# Patient Record
Sex: MALE | Race: WHITE | NOT HISPANIC OR LATINO | Employment: FULL TIME | URBAN - METROPOLITAN AREA
[De-identification: names, ages, dates, MRNs, and addresses within clinical notes are randomized per-mention and may not be internally consistent; named-entity substitution may affect disease eponyms.]

---

## 2022-03-08 ENCOUNTER — OFFICE VISIT (OUTPATIENT)
Dept: FAMILY MEDICINE | Facility: CLINIC | Age: 42
End: 2022-03-08
Payer: COMMERCIAL

## 2022-03-08 VITALS
DIASTOLIC BLOOD PRESSURE: 82 MMHG | OXYGEN SATURATION: 98 % | SYSTOLIC BLOOD PRESSURE: 121 MMHG | HEART RATE: 90 BPM | TEMPERATURE: 97.8 F | WEIGHT: 187 LBS | HEIGHT: 73 IN | BODY MASS INDEX: 24.78 KG/M2 | RESPIRATION RATE: 16 BRPM

## 2022-03-08 DIAGNOSIS — Z11.59 NEED FOR HEPATITIS C SCREENING TEST: ICD-10-CM

## 2022-03-08 DIAGNOSIS — K63.5 HYPERPLASTIC COLONIC POLYP, UNSPECIFIED PART OF COLON: ICD-10-CM

## 2022-03-08 DIAGNOSIS — Z12.11 SCREENING FOR COLON CANCER: ICD-10-CM

## 2022-03-08 DIAGNOSIS — Z80.0 FAMILY HISTORY OF COLON CANCER: ICD-10-CM

## 2022-03-08 DIAGNOSIS — Z00.00 ROUTINE GENERAL MEDICAL EXAMINATION AT A HEALTH CARE FACILITY: Primary | ICD-10-CM

## 2022-03-08 DIAGNOSIS — N52.9 ERECTILE DYSFUNCTION, UNSPECIFIED ERECTILE DYSFUNCTION TYPE: ICD-10-CM

## 2022-03-08 DIAGNOSIS — Z11.4 SCREENING FOR HIV (HUMAN IMMUNODEFICIENCY VIRUS): ICD-10-CM

## 2022-03-08 LAB
CHOLEST SERPL-MCNC: 219 MG/DL
FASTING STATUS PATIENT QL REPORTED: YES
FASTING STATUS PATIENT QL REPORTED: YES
GLUCOSE BLD-MCNC: 98 MG/DL (ref 70–99)
HCV AB SERPL QL IA: NONREACTIVE
HDLC SERPL-MCNC: 71 MG/DL
HIV 1+2 AB+HIV1 P24 AG SERPL QL IA: NONREACTIVE
LDLC SERPL CALC-MCNC: 118 MG/DL
NONHDLC SERPL-MCNC: 148 MG/DL
TRIGL SERPL-MCNC: 150 MG/DL

## 2022-03-08 PROCEDURE — 99386 PREV VISIT NEW AGE 40-64: CPT | Performed by: PHYSICIAN ASSISTANT

## 2022-03-08 PROCEDURE — 87389 HIV-1 AG W/HIV-1&-2 AB AG IA: CPT | Performed by: PHYSICIAN ASSISTANT

## 2022-03-08 PROCEDURE — 82947 ASSAY GLUCOSE BLOOD QUANT: CPT | Performed by: PHYSICIAN ASSISTANT

## 2022-03-08 PROCEDURE — 86803 HEPATITIS C AB TEST: CPT | Performed by: PHYSICIAN ASSISTANT

## 2022-03-08 PROCEDURE — 36415 COLL VENOUS BLD VENIPUNCTURE: CPT | Performed by: PHYSICIAN ASSISTANT

## 2022-03-08 PROCEDURE — 80061 LIPID PANEL: CPT | Performed by: PHYSICIAN ASSISTANT

## 2022-03-08 RX ORDER — SILDENAFIL CITRATE 20 MG/1
TABLET ORAL
Qty: 30 TABLET | Refills: 11 | Status: SHIPPED | OUTPATIENT
Start: 2022-03-08 | End: 2023-10-10

## 2022-03-08 SDOH — HEALTH STABILITY: PHYSICAL HEALTH: ON AVERAGE, HOW MANY MINUTES DO YOU ENGAGE IN EXERCISE AT THIS LEVEL?: 0 MIN

## 2022-03-08 SDOH — ECONOMIC STABILITY: INCOME INSECURITY: HOW HARD IS IT FOR YOU TO PAY FOR THE VERY BASICS LIKE FOOD, HOUSING, MEDICAL CARE, AND HEATING?: NOT HARD AT ALL

## 2022-03-08 SDOH — ECONOMIC STABILITY: TRANSPORTATION INSECURITY
IN THE PAST 12 MONTHS, HAS LACK OF TRANSPORTATION KEPT YOU FROM MEETINGS, WORK, OR FROM GETTING THINGS NEEDED FOR DAILY LIVING?: YES

## 2022-03-08 SDOH — ECONOMIC STABILITY: FOOD INSECURITY: WITHIN THE PAST 12 MONTHS, THE FOOD YOU BOUGHT JUST DIDN'T LAST AND YOU DIDN'T HAVE MONEY TO GET MORE.: NEVER TRUE

## 2022-03-08 SDOH — ECONOMIC STABILITY: INCOME INSECURITY: IN THE LAST 12 MONTHS, WAS THERE A TIME WHEN YOU WERE NOT ABLE TO PAY THE MORTGAGE OR RENT ON TIME?: NO

## 2022-03-08 SDOH — ECONOMIC STABILITY: TRANSPORTATION INSECURITY
IN THE PAST 12 MONTHS, HAS THE LACK OF TRANSPORTATION KEPT YOU FROM MEDICAL APPOINTMENTS OR FROM GETTING MEDICATIONS?: YES

## 2022-03-08 SDOH — ECONOMIC STABILITY: FOOD INSECURITY: WITHIN THE PAST 12 MONTHS, YOU WORRIED THAT YOUR FOOD WOULD RUN OUT BEFORE YOU GOT MONEY TO BUY MORE.: NEVER TRUE

## 2022-03-08 SDOH — HEALTH STABILITY: PHYSICAL HEALTH: ON AVERAGE, HOW MANY DAYS PER WEEK DO YOU ENGAGE IN MODERATE TO STRENUOUS EXERCISE (LIKE A BRISK WALK)?: 0 DAYS

## 2022-03-08 ASSESSMENT — LIFESTYLE VARIABLES
HOW MANY STANDARD DRINKS CONTAINING ALCOHOL DO YOU HAVE ON A TYPICAL DAY: 3 OR 4
HOW OFTEN DO YOU HAVE SIX OR MORE DRINKS ON ONE OCCASION: MONTHLY
HOW OFTEN DO YOU HAVE A DRINK CONTAINING ALCOHOL: 4 OR MORE TIMES A WEEK

## 2022-03-08 ASSESSMENT — ENCOUNTER SYMPTOMS
EYE PAIN: 0
CHILLS: 0
WEAKNESS: 0
ABDOMINAL PAIN: 0
SORE THROAT: 0
PALPITATIONS: 0
MYALGIAS: 0
FREQUENCY: 0
COUGH: 0
FEVER: 0
HEARTBURN: 0
JOINT SWELLING: 0
HEADACHES: 0
NERVOUS/ANXIOUS: 0
DYSURIA: 0
SHORTNESS OF BREATH: 0
HEMATURIA: 0
CONSTIPATION: 0
ARTHRALGIAS: 0
HEMATOCHEZIA: 0
PARESTHESIAS: 0
DIARRHEA: 0
NAUSEA: 0
DIZZINESS: 0

## 2022-03-08 ASSESSMENT — SOCIAL DETERMINANTS OF HEALTH (SDOH)
HOW OFTEN DO YOU GET TOGETHER WITH FRIENDS OR RELATIVES?: ONCE A WEEK
IN A TYPICAL WEEK, HOW MANY TIMES DO YOU TALK ON THE PHONE WITH FAMILY, FRIENDS, OR NEIGHBORS?: MORE THAN THREE TIMES A WEEK
DO YOU BELONG TO ANY CLUBS OR ORGANIZATIONS SUCH AS CHURCH GROUPS UNIONS, FRATERNAL OR ATHLETIC GROUPS, OR SCHOOL GROUPS?: YES
HOW OFTEN DO YOU ATTEND CHURCH OR RELIGIOUS SERVICES?: MORE THAN 4 TIMES PER YEAR

## 2022-03-08 NOTE — LETTER
March 8, 2022      Emil Aparicio  [address] [place] [id number]        Dear ,    We are writing to inform you of your test results.    Cholesterol is a bit higher than ideal but all other labs were normal. Improved diet and exercise changes where possible is all that is recommended and we can recheck this in 1 year. A low fat, carbohydrate-controlled diet should be adopted. Saturated fat should not make up more than 7% of your total daily calories, carbohydrates should be restricted to 50% to 60% of daily calories, and simple sugars like sucrose should be avoided. You may also consider increasing your intake of oily fish by either an increase in your fish intake or a fish oil supplement to at least 2 servings per week. Alcohol should also be limited and at least 30 minutes of aerobic exercise 5 days a week would be beneficial to implement.        Resulted Orders   HIV Antigen Antibody Combo   Result Value Ref Range    HIV Antigen Antibody Combo Nonreactive Nonreactive      Comment:      HIV-1 p24 Ag & HIV-1/HIV-2 Ab Not Detected   Hepatitis C Screen Reflex to HCV RNA Quant and Genotype   Result Value Ref Range    Hepatitis C Antibody Nonreactive Nonreactive    Narrative    Assay performance characteristics have not been established for newborns, infants, and children.   Lipid panel reflex to direct LDL Fasting   Result Value Ref Range    Cholesterol 219 (H) <200 mg/dL    Triglycerides 150 (H) <150 mg/dL    Direct Measure HDL 71 >=40 mg/dL    LDL Cholesterol Calculated 118 (H) <=100 mg/dL    Non HDL Cholesterol 148 (H) <130 mg/dL    Patient Fasting > 8hrs? Yes     Narrative    Cholesterol  Desirable:  <200 mg/dL    Triglycerides  Normal:  Less than 150 mg/dL  Borderline High:  150-199 mg/dL  High:  200-499 mg/dL  Very High:  Greater than or equal to 500 mg/dL    Direct Measure HDL  Female:  Greater than or equal to 50 mg/dL   Male:  Greater than or equal to 40 mg/dL    LDL  Cholesterol  Desirable:  <100mg/dL  Above Desirable:  100-129 mg/dL   Borderline High:  130-159 mg/dL   High:  160-189 mg/dL   Very High:  >= 190 mg/dL    Non HDL Cholesterol  Desirable:  130 mg/dL  Above Desirable:  130-159 mg/dL  Borderline High:  160-189 mg/dL  High:  190-219 mg/dL  Very High:  Greater than or equal to 220 mg/dL   Glucose   Result Value Ref Range    Glucose 98 70 - 99 mg/dL    Patient Fasting > 8hrs? Yes        If you have any questions or concerns, please call the clinic at the number listed above.       Sincerely,      Konrad Correa PA-C

## 2022-03-08 NOTE — PROGRESS NOTES
SUBJECTIVE:   CC: Emil Viera CARMENCITA Aparicio is an 41 year old male who presents for preventative health visit.         Healthy Habits:     Getting at least 3 servings of Calcium per day:  NO    Bi-annual eye exam:  Yes    Dental care twice a year:  Yes    Sleep apnea or symptoms of sleep apnea:  Sleep apnea    Diet:  Breakfast skipped    Frequency of exercise:  None    Taking medications regularly:  Yes    Medication side effects:  None    PHQ-2 Total Score: 0    Additional concerns today:  Yes        Needing referral for colonoscopy. Family history of colon cancer but not in first degree relatives. [facility] obtains this every 5 years since age 28 and is overdue. Per [facility] review, history of hyperplastic polyps.     Also [facility] for ~1 year notes troubles obtaining and maintaining erection at times. Requesting management for this. [facility] went through divorce years ago and this is now a new problem. No urination changes.     Today's PHQ-2 Score:   PHQ-2 ( 1999 Pf[employer]) 3/8/2022   Q1: Little interest or pleasure in doing things 0   Q2: Feeling down, depressed or hopeless 0   PHQ-2 Score 0   Q1: Little interest or pleasure in doing things Not at all   Q2: Feeling down, depressed or hopeless Not at all   PHQ-2 Score 0       Abuse: Current or Past(Physical, Sexual or Emotional)- No  Do you feel safe in your environment? Yes    Have you ever done Advance Care Planning? (For example, a Health Directive, POLST, or a discussion with a medical provider or your loved ones about your wishes): No, advance care planning information given to patient to review.  Advanced care planning was discussed at today's visit.    Social History     Tobacco Use     Smoking status: Never Smoker     Smokeless tobacco: Never Used   Substance Use Topics     Alcohol use: Yes     If you drink alcohol do you typically have >3 drinks per day or >7 drinks per week? Yes      Alcohol Use 3/8/2022   Prescreen: >3 drinks/day or >7 drinks/week? Yes   Prescreen: >3  drinks/day or >7 drinks/week? -   AUDIT SCORE  7     AUDIT - Alcohol Use Disorders Identification Test - Reproduced from the World Health Organization Audit 2001 (Second Edition) 3/8/2022   1.  How often do you have a drink containing alcohol? 4 or more times a week   2.  How many drinks containing alcohol do you have on a typical day when you are drinking? 3 or 4   3.  How often do you have five or more drinks on one occasion? Monthly   4.  How often during the last year have you found that you were not able to stop drinking once you had started? Never   5.  How often during the last year have you failed to do what was normally expected of you because of drinking? Never   6.  How often during the last year have you needed a first drink in the morning to get yourself going after a heavy drinking session? Never   7.  How often during the last year have you had a feeling of guilt or remorse after drinking? Never   8.  How often during the last year have you been unable to remember what happened the night before because of your drinking? Never   9.  Have you or someone else been injured because of your drinking? No   10. Has a relative, friend, doctor or other health care worker been concerned about your drinking or suggested you cut down? No   TOTAL SCORE 7       Last PSA: No results found for: PSA    Reviewed orders with patient. Reviewed health maintenance and updated orders accordingly - Yes  BP Readings from Last 3 Encounters:   03/08/22 121/82    Wt Readings from Last 3 Encounters:   03/08/22 84.8 kg (187 lb)                  Patient Active Problem List   Diagnosis     Hyperplastic colonic polyp, unspecified part of colon     History reviewed. No pertinent surgical history.    Social History     Tobacco Use     Smoking status: Never Smoker     Smokeless tobacco: Never Used   Substance Use Topics     Alcohol use: Yes     Family History   Problem Relation Age of Onset     Kidney Cancer Father      Colon Cancer  "Paternal Grandfather      Colon Cancer Paternal Uncle      Colon Cancer Paternal Uncle          Current Outpatient Medications   Medication Sig Dispense Refill     sildenafil (REVATIO) 20 MG tablet Take 1-5 tabs ( mg) 30 minutes to 4 hours prior to intercourse. 30 tablet 11     No Known Allergies  Recent Labs   Lab Test 03/08/22  0859   *   HDL 71   TRIG 150*        Reviewed and updated as needed this visit by clinical staff   Tobacco  Allergies  Meds  Problems  Med Hx  Surg Hx  Fam Hx  Soc   Hx        Reviewed and updated as needed this visit by Provider   Tobacco  Allergies  Meds  Problems  Med Hx  Surg Hx  Fam Hx         History reviewed. No pertinent past medical history.   History reviewed. No pertinent surgical history.    Review of Systems   Constitutional: Negative for chills and fever.   HENT: Negative for congestion, ear pain, hearing loss and sore throat.    Eyes: Negative for pain and visual disturbance.   Respiratory: Negative for cough and shortness of breath.    Cardiovascular: Negative for chest pain, palpitations and peripheral edema.   Gastrointestinal: Negative for abdominal pain, constipation, diarrhea, heartburn, hematochezia and nausea.   Genitourinary: Positive for impotence. Negative for dysuria, frequency, genital sores, hematuria, penile discharge and urgency.   Musculoskeletal: Negative for arthralgias, joint swelling and myalgias.   Skin: Negative for rash.   Neurological: Negative for dizziness, weakness, headaches and paresthesias.   Psychiatric/Behavioral: Negative for mood changes. The patient is not nervous/anxious.          OBJECTIVE:   /82 (BP Location: Right arm, Patient Position: Chair, Cuff Size: Adult Large)   Pulse 90   Temp 97.8  F (36.6  C) (Oral)   Resp 16   Ht 1.861 m (6' 1.25\")   Wt 84.8 kg (187 lb)   SpO2 98%   BMI 24.50 kg/m      Physical Exam  GENERAL: healthy, alert and no distress  EYES: Eyes grossly normal to inspection, PERRL " and conjunctivae and sclerae normal  HENT: ear canals and TM's normal, nose and mouth without ulcers or lesions  NECK: no adenopathy, no asymmetry, masses, or scars and thyroid normal to palpation  RESP: lungs clear to auscultation - no rales, rhonchi or wheezes  CV: regular rate and rhythm, normal S1 S2, no S3 or S4, no murmur, click or rub, no peripheral edema and peripheral pulses strong  ABDOMEN: soft, nontender, no hepatosplenomegaly, no masses and bowel sounds normal   (male): normal male genitalia without lesions or urethral discharge, no hernia  MS: no gross musculoskeletal defects noted, no edema  SKIN: no suspicious lesions or rashes  NEURO: Normal strength and tone, mentation intact and speech normal  PSYCH: mentation appears normal, affect normal/bright  LYMPH: no cervical adenopathy    Diagnostic Test Results:  none     ASSESSMENT/PLAN:   (Z00.00) Routine general medical examination at a health care facility  (primary encounter diagnosis)  Comment: stable exam. Labs pending. Discussed diet and exercise as well as alcohol use. All can be affecting ED. Will manage as below as well.   Plan:     (Z11.59) Need for hepatitis C screening test  Comment:   Plan: Hepatitis C Screen Reflex to HCV RNA Quant and         Genotype            (Z11.4) Screening for HIV (human immunodeficiency virus)  Comment:   Plan: HIV Antigen Antibody Combo            (Z80.0) Family history of colon cancer  Comment:   Plan: Adult Gastro Ref - Procedure Only            (Z12.11) Screening for colon cancer  Comment:   Plan: Lipid panel reflex to direct LDL Fasting,         Glucose, Adult Gastro Ref - Procedure Only            (N52.9) Erectile dysfunction, unspecified erectile dysfunction type  Comment: prn management recommended but will assess for underling cause in lipids and glucose. bp controlled. Recommending diet and alcohol avoidance management as well.   Plan: sildenafil (REVATIO) 20 MG tablet        -Medication use and side  "effects discussed with the patient. Patient is in complete understanding and agreement with plan.     (K63.5) Hyperplastic colonic polyp, unspecified part of colon  Comment:   Plan: Adult Gastro Ref - Procedure Only            COUNSELING:   Reviewed preventive health counseling, as reflected in patient instructions       Regular exercise       Healthy diet/nutrition       Immunizations    Declined: Influenza due to Conscientious objector               Consider Hep C screening for all patients one time for ages 18-79 years       HIV screeninx in teen years, 1x in adult years, and at intervals if high risk    Estimated body mass index is 24.5 kg/m  as calculated from the following:    Height as of this encounter: 1.861 m (6' 1.25\").    Weight as of this encounter: 84.8 kg (187 lb).         He reports that he has never smoked. He has never used smokeless tobacco.      Counseling Resources:  ATP IV Guidelines  Pooled Cohorts Equation Calculator  FRAX Risk Assessment  ICSI Preventive Guidelines  Dietary Guidelines for Americans,   USDA's MyPlate  ASA Prophylaxis  Lung CA Screening    Konrad Correa PA-C  [facility]"

## 2022-03-24 ENCOUNTER — TRANSFERRED RECORDS (OUTPATIENT)
Dept: HEALTH INFORMATION MANAGEMENT | Facility: CLINIC | Age: 42
End: 2022-03-24
Payer: COMMERCIAL

## 2022-10-07 ENCOUNTER — APPOINTMENT (OUTPATIENT)
Dept: GENERAL RADIOLOGY | Facility: CLINIC | Age: 42
End: 2022-10-07
Attending: EMERGENCY MEDICINE
Payer: COMMERCIAL

## 2022-10-07 ENCOUNTER — HOSPITAL ENCOUNTER (EMERGENCY)
Facility: CLINIC | Age: 42
Discharge: HOME | End: 2022-10-07
Attending: EMERGENCY MEDICINE | Admitting: EMERGENCY MEDICINE
Payer: COMMERCIAL

## 2022-10-07 VITALS
DIASTOLIC BLOOD PRESSURE: 83 MMHG | HEART RATE: 90 BPM | BODY MASS INDEX: 24.24 KG/M2 | OXYGEN SATURATION: 99 % | WEIGHT: 185 LBS | SYSTOLIC BLOOD PRESSURE: 123 MMHG | RESPIRATION RATE: 17 BRPM | TEMPERATURE: 98.8 F

## 2022-10-07 DIAGNOSIS — R11.0 NAUSEA: ICD-10-CM

## 2022-10-07 DIAGNOSIS — J40 BRONCHITIS: ICD-10-CM

## 2022-10-07 DIAGNOSIS — R30.0 DYSURIA: ICD-10-CM

## 2022-10-07 LAB
ALBUMIN SERPL BCG-MCNC: 4 G/DL (ref 3.5–5.2)
ALBUMIN UR-MCNC: 10 MG/DL
ALP SERPL-CCNC: 59 U/L (ref 40–129)
ALT SERPL W P-5'-P-CCNC: 32 U/L (ref 10–50)
ANION GAP SERPL CALCULATED.3IONS-SCNC: 11 MMOL/L (ref 7–15)
APPEARANCE UR: CLEAR
AST SERPL W P-5'-P-CCNC: 24 U/L (ref 10–50)
ATRIAL RATE - MUSE: 96 BPM
BASOPHILS # BLD AUTO: 0 10E3/UL (ref 0–0.2)
BASOPHILS NFR BLD AUTO: 0 %
BILIRUB SERPL-MCNC: 0.5 MG/DL
BILIRUB UR QL STRIP: NEGATIVE
BUN SERPL-MCNC: 13.7 MG/DL (ref 6–20)
CALCIUM SERPL-MCNC: 8.9 MG/DL (ref 8.6–10)
CHLORIDE SERPL-SCNC: 98 MMOL/L (ref 98–107)
COLOR UR AUTO: YELLOW
CREAT SERPL-MCNC: 0.57 MG/DL (ref 0.67–1.17)
DEPRECATED HCO3 PLAS-SCNC: 25 MMOL/L (ref 22–29)
DEPRECATED S PYO AG THROAT QL EIA: NEGATIVE
DIASTOLIC BLOOD PRESSURE - MUSE: NORMAL MMHG
EOSINOPHIL # BLD AUTO: 0 10E3/UL (ref 0–0.7)
EOSINOPHIL NFR BLD AUTO: 0 %
ERYTHROCYTE [DISTWIDTH] IN BLOOD BY AUTOMATED COUNT: 11.9 % (ref 10–15)
FLUAV RNA SPEC QL NAA+PROBE: NEGATIVE
FLUBV RNA RESP QL NAA+PROBE: NEGATIVE
GFR SERPL CREATININE-BSD FRML MDRD: >90 ML/MIN/1.73M2
GLUCOSE SERPL-MCNC: 110 MG/DL (ref 70–99)
GLUCOSE UR STRIP-MCNC: NEGATIVE MG/DL
GROUP A STREP BY PCR: NOT DETECTED
HCT VFR BLD AUTO: 45.7 % (ref 40–53)
HGB BLD-MCNC: 15.4 G/DL (ref 13.3–17.7)
HGB UR QL STRIP: NEGATIVE
HIV 1+2 AB+HIV1 P24 AG SERPL QL IA: NONREACTIVE
HOLD SPECIMEN: NORMAL
HOLD SPECIMEN: NORMAL
IMM GRANULOCYTES # BLD: 0 10E3/UL
IMM GRANULOCYTES NFR BLD: 0 %
INTERPRETATION ECG - MUSE: NORMAL
KETONES UR STRIP-MCNC: 100 MG/DL
LEUKOCYTE ESTERASE UR QL STRIP: NEGATIVE
LIPASE SERPL-CCNC: 27 U/L (ref 13–60)
LYMPHOCYTES # BLD AUTO: 0.5 10E3/UL (ref 0.8–5.3)
LYMPHOCYTES NFR BLD AUTO: 8 %
MAGNESIUM SERPL-MCNC: 1.9 MG/DL (ref 1.7–2.3)
MCH RBC QN AUTO: 31 PG (ref 26.5–33)
MCHC RBC AUTO-ENTMCNC: 33.7 G/DL (ref 31.5–36.5)
MCV RBC AUTO: 92 FL (ref 78–100)
MONOCYTES # BLD AUTO: 0.8 10E3/UL (ref 0–1.3)
MONOCYTES NFR BLD AUTO: 11 %
MONOCYTES NFR BLD AUTO: NEGATIVE %
MUCOUS THREADS #/AREA URNS LPF: PRESENT /LPF
NEUTROPHILS # BLD AUTO: 5.5 10E3/UL (ref 1.6–8.3)
NEUTROPHILS NFR BLD AUTO: 81 %
NITRATE UR QL: NEGATIVE
NRBC # BLD AUTO: 0 10E3/UL
NRBC BLD AUTO-RTO: 0 /100
P AXIS - MUSE: 46 DEGREES
PH UR STRIP: 5.5 [PH] (ref 5–7)
PLATELET # BLD AUTO: 138 10E3/UL (ref 150–450)
POTASSIUM SERPL-SCNC: 4 MMOL/L (ref 3.4–5.3)
PR INTERVAL - MUSE: 148 MS
PROT SERPL-MCNC: 6.9 G/DL (ref 6.4–8.3)
QRS DURATION - MUSE: 94 MS
QT - MUSE: 344 MS
QTC - MUSE: 434 MS
R AXIS - MUSE: -34 DEGREES
RBC # BLD AUTO: 4.97 10E6/UL (ref 4.4–5.9)
RBC URINE: 3 /HPF
RSV RNA SPEC NAA+PROBE: NEGATIVE
SARS-COV-2 RNA RESP QL NAA+PROBE: NEGATIVE
SODIUM SERPL-SCNC: 134 MMOL/L (ref 136–145)
SP GR UR STRIP: 1.02 (ref 1–1.03)
SYSTOLIC BLOOD PRESSURE - MUSE: NORMAL MMHG
T AXIS - MUSE: 24 DEGREES
T PALLIDUM AB SER QL: NONREACTIVE
TSH SERPL DL<=0.005 MIU/L-ACNC: 0.77 UIU/ML (ref 0.3–4.2)
UROBILINOGEN UR STRIP-MCNC: NORMAL MG/DL
VENTRICULAR RATE- MUSE: 96 BPM
WBC # BLD AUTO: 6.8 10E3/UL (ref 4–11)
WBC URINE: 2 /HPF

## 2022-10-07 PROCEDURE — 83690 ASSAY OF LIPASE: CPT | Performed by: EMERGENCY MEDICINE

## 2022-10-07 PROCEDURE — 84443 ASSAY THYROID STIM HORMONE: CPT | Performed by: EMERGENCY MEDICINE

## 2022-10-07 PROCEDURE — 96360 HYDRATION IV INFUSION INIT: CPT

## 2022-10-07 PROCEDURE — 99285 EMERGENCY DEPT VISIT HI MDM: CPT | Mod: CS,25

## 2022-10-07 PROCEDURE — 87591 N.GONORRHOEAE DNA AMP PROB: CPT | Performed by: EMERGENCY MEDICINE

## 2022-10-07 PROCEDURE — 81001 URINALYSIS AUTO W/SCOPE: CPT | Performed by: EMERGENCY MEDICINE

## 2022-10-07 PROCEDURE — 87651 STREP A DNA AMP PROBE: CPT | Performed by: EMERGENCY MEDICINE

## 2022-10-07 PROCEDURE — 80053 COMPREHEN METABOLIC PANEL: CPT | Performed by: EMERGENCY MEDICINE

## 2022-10-07 PROCEDURE — 36415 COLL VENOUS BLD VENIPUNCTURE: CPT | Performed by: EMERGENCY MEDICINE

## 2022-10-07 PROCEDURE — 93005 ELECTROCARDIOGRAM TRACING: CPT

## 2022-10-07 PROCEDURE — 87389 HIV-1 AG W/HIV-1&-2 AB AG IA: CPT | Performed by: EMERGENCY MEDICINE

## 2022-10-07 PROCEDURE — 83735 ASSAY OF MAGNESIUM: CPT | Performed by: EMERGENCY MEDICINE

## 2022-10-07 PROCEDURE — C9803 HOPD COVID-19 SPEC COLLECT: HCPCS

## 2022-10-07 PROCEDURE — 82040 ASSAY OF SERUM ALBUMIN: CPT | Performed by: EMERGENCY MEDICINE

## 2022-10-07 PROCEDURE — 71046 X-RAY EXAM CHEST 2 VIEWS: CPT

## 2022-10-07 PROCEDURE — 87637 SARSCOV2&INF A&B&RSV AMP PRB: CPT | Performed by: EMERGENCY MEDICINE

## 2022-10-07 PROCEDURE — 86308 HETEROPHILE ANTIBODY SCREEN: CPT | Performed by: EMERGENCY MEDICINE

## 2022-10-07 PROCEDURE — 87491 CHLMYD TRACH DNA AMP PROBE: CPT | Performed by: EMERGENCY MEDICINE

## 2022-10-07 PROCEDURE — 86780 TREPONEMA PALLIDUM: CPT | Performed by: EMERGENCY MEDICINE

## 2022-10-07 PROCEDURE — 258N000003 HC RX IP 258 OP 636: Performed by: EMERGENCY MEDICINE

## 2022-10-07 PROCEDURE — 85025 COMPLETE CBC W/AUTO DIFF WBC: CPT | Performed by: EMERGENCY MEDICINE

## 2022-10-07 RX ORDER — ONDANSETRON 4 MG/1
4 TABLET, ORALLY DISINTEGRATING ORAL EVERY 8 HOURS PRN
Qty: 10 TABLET | Refills: 0 | Status: SHIPPED | OUTPATIENT
Start: 2022-10-07 | End: 2022-10-10

## 2022-10-07 RX ORDER — AZITHROMYCIN 250 MG/1
TABLET, FILM COATED ORAL
Qty: 6 TABLET | Refills: 0 | Status: SHIPPED | OUTPATIENT
Start: 2022-10-07 | End: 2022-10-12

## 2022-10-07 RX ORDER — SODIUM CHLORIDE 9 MG/ML
INJECTION, SOLUTION INTRAVENOUS CONTINUOUS
Status: DISCONTINUED | OUTPATIENT
Start: 2022-10-07 | End: 2022-10-07 | Stop reason: HOSPADM

## 2022-10-07 RX ADMIN — SODIUM CHLORIDE 1000 ML: 9 INJECTION, SOLUTION INTRAVENOUS at 09:40

## 2022-10-07 ASSESSMENT — ENCOUNTER SYMPTOMS
DYSURIA: 1
NAUSEA: 1
FATIGUE: 1
COUGH: 1
SORE THROAT: 1
FREQUENCY: 1
CHILLS: 1
APPETITE CHANGE: 1

## 2022-10-07 ASSESSMENT — ACTIVITIES OF DAILY LIVING (ADL): ADLS_ACUITY_SCORE: 35

## 2022-10-07 NOTE — DISCHARGE INSTRUCTIONS
Return to the ER for any worsening symptoms.    Please follow-up with your primary care clinic in 1 week.

## 2022-10-07 NOTE — ED PROVIDER NOTES
History   Chief Complaint:  Cough       The history is provided by the patient.      Emil Aparicio is an otherwise healthy 41 year old male who presents with a cough. The patient reports having four weeks of a productive cough, which he thinks might be related to trying to clear his throat. He states that then 3 days ago he developed significant fatigue, but was unable to sleep at night due to chills. He states that for the past 2 days he has laid in bed all day, and has been unable to eat due to being nauseous and having two painful canker sores. He reports dry heaving due to the waves of nausea. He also reports experiencing urinary frequency two nights ago, and then dysuria starting yesterday. He endorses a sore throat, but states that he has had 3 negative Covid tests at home. He denies any rash. He reports significant alcohol use, but denies ever having withdrawal symptoms or experiencing abdominal pain after drinking. He denies any sick exposures including at work, and states that he has been with the same sexual partner for the past 3.5 months. He states that he is generally healthy, and only takes daily medications for allergies. He notes a family history of colon cancer but no cardiac issues. No drug use or tobacco use.     Review of Systems   Constitutional: Positive for appetite change, chills and fatigue.   HENT: Positive for sore throat.    Respiratory: Positive for cough.    Gastrointestinal: Positive for nausea.   Genitourinary: Positive for dysuria and frequency.   Skin: Negative for rash.   All other systems reviewed and are negative.        Allergies:  The patient has no known allergies.     Medications:  The patient is currently on no regular medications.    Past Medical History:     Hyperplastic colonic polyp  Allergic rhinitis  Anal fissure  Hemorrhoids     Family History:    Father: kidney cancer    Social History:  The patient presents to the ED unaccompanied  Alcohol Use:  positive  Tobacco Use: negative  Drug Use: negative  Occupation:  for [employer]  PCP: Konrad Correa     Physical Exam     Patient Vitals for the past 24 hrs:   BP Temp Temp src Pulse Resp SpO2 Weight   10/07/22 1115 123/83 -- -- 90 -- -- --   10/07/22 1100 131/81 -- -- 88 17 99 % --   10/07/22 0945 130/82 -- -- 93 -- 95 % --   10/07/22 0903 (!) 136/101 98.8  F (37.1  C) Temporal 112 14 98 % 83.9 kg (185 lb)       Physical Exam  Constitutional:       General: He is not in acute distress.     Appearance: He is not diaphoretic.   HENT:      Head: Atraumatic.      Right Ear: Tympanic membrane, ear canal and external ear normal.      Left Ear: Tympanic membrane, ear canal and external ear normal.      Mouth/Throat:      Mouth: Mucous membranes are moist.      Pharynx: Oropharynx is clear. No oropharyngeal exudate or posterior oropharyngeal erythema.   Eyes:      General: No scleral icterus.     Pupils: Pupils are equal, round, and reactive to light.   Cardiovascular:      Rate and Rhythm: Normal rate and regular rhythm.      Heart sounds: Normal heart sounds.   Pulmonary:      Effort: No respiratory distress.      Breath sounds: Normal breath sounds.   Abdominal:      General: Bowel sounds are normal.      Palpations: Abdomen is soft.      Tenderness: There is no abdominal tenderness.   Musculoskeletal:         General: No tenderness.      Cervical back: Normal range of motion and neck supple. No rigidity.      Right lower leg: No edema.      Left lower leg: No edema.   Skin:     General: Skin is warm.      Capillary Refill: Capillary refill takes less than 2 seconds.      Findings: No rash.   Neurological:      General: No focal deficit present.      Mental Status: He is oriented to person, place, and time.   Psychiatric:         Mood and Affect: Mood normal.         Behavior: Behavior normal.           Emergency Department Course   ECG  ECG taken at 0935, ECG read at 0938  Normal sinus rhythm. Left  axis deviation. Abnormal ECG.   No prior ECG for comparison.   Rate 96 bpm. WV interval 148. QRS duration 94. QT/QTc 344/434. P-R-T axes 46 -34 24.    Imaging:  XR Chest 2 Views   Final Result   IMPRESSION:  There are no acute infiltrates. The cardiac silhouette is   not enlarged. Pulmonary vasculature is unremarkable.       INA MARTINEZ MD            SYSTEM ID:  [id number]        Report per radiology    Laboratory:  Labs Ordered and Resulted from Time of ED Arrival to Time of ED Departure   COMPREHENSIVE METABOLIC PANEL - Abnormal       Result Value    Sodium 134 (*)     Potassium 4.0      Chloride 98      Carbon Dioxide (CO2) 25      Anion Gap 11      Urea Nitrogen 13.7      Creatinine 0.57 (*)     Calcium 8.9      Glucose 110 (*)     Alkaline Phosphatase 59      AST 24      ALT 32      Protein Total 6.9      Albumin 4.0      Bilirubin Total 0.5      GFR Estimate >90     ROUTINE UA WITH MICROSCOPIC REFLEX TO CULTURE - Abnormal    Color Urine Yellow      Appearance Urine Clear      Glucose Urine Negative      Bilirubin Urine Negative      Ketones Urine 100  (*)     Specific Gravity Urine 1.023      Blood Urine Negative      pH Urine 5.5      Protein Albumin Urine 10  (*)     Urobilinogen Urine Normal      Nitrite Urine Negative      Leukocyte Esterase Urine Negative      Mucus Urine Present (*)     RBC Urine 3 (*)     WBC Urine 2     CBC WITH PLATELETS AND DIFFERENTIAL - Abnormal    WBC Count 6.8      RBC Count 4.97      Hemoglobin 15.4      Hematocrit 45.7      MCV 92      MCH 31.0      MCHC 33.7      RDW 11.9      Platelet Count 138 (*)     % Neutrophils 81      % Lymphocytes 8      % Monocytes 11      % Eosinophils 0      % Basophils 0      % Immature Granulocytes 0      NRBCs per 100 WBC 0      Absolute Neutrophils 5.5      Absolute Lymphocytes 0.5 (*)     Absolute Monocytes 0.8      Absolute Eosinophils 0.0      Absolute Basophils 0.0      Absolute Immature Granulocytes 0.0      Absolute NRBCs 0.0     LIPASE -  Normal    Lipase 27     MAGNESIUM - Normal    Magnesium 1.9     TSH WITH FREE T4 REFLEX - Normal    TSH 0.77     INFLUENZA A/B & SARS-COV2 PCR MULTIPLEX - Normal    Influenza A PCR Negative      Influenza B PCR Negative      RSV PCR Negative      SARS CoV2 PCR Negative     MONONUCLEOSIS SCREEN - Normal    Mononucleosis Screen Negative     STREPTOCOCCUS A RAPID SCREEN W REFELX TO PCR - Normal    Group A Strep antigen Negative     HIV ANTIGEN ANTIBODY COMBO   NEISSERIA GONORRHOEAE PCR   CHLAMYDIA TRACHOMATIS PCR        Emergency Department Course:           Reviewed:  I reviewed nursing notes, vitals and past medical history    Assessments:  0910 I obtained history and examined the patient as noted above.   1120 I rechecked the patient and explained findings.     Interventions:  0940 NS 1L IV    Disposition:  The patient was discharged to home.     Impression & Plan     Medical Decision Making:  This patient is a 41-year-old man who presents to the emergency department for evaluation of coughing over the last 4 weeks.  At times he feels nauseous which has been a recent development.  Has been having dysuria and increased urinary frequency.    X-ray does not show pneumonia.  Lab work-up overall looks good.  Urine does not appear to be infected.  Blood sugar looks good.  The patient has been very stable throughout almost 3 hours of observation here.  He did request to be checked for an STD although he says he has not had any recent new sexual partners.  GC and chlamydia as well as HIV and syphilis are pending.    The patient is overall stable.  Given his ongoing cough he will be treated with azithromycin for bronchitis.  He will follow-up with his primary care physician in the outpatient setting.    Diagnosis:    ICD-10-CM    1. Bronchitis  J40    2. Dysuria  R30.0    3. Nausea  R11.0        Discharge Medications:  Discharge Medication List as of 10/7/2022 11:30 AM      START taking these medications    Details    azithromycin (ZITHROMAX Z-J CARLOS) 250 MG tablet Two tablets on the first day, then one tablet daily for the next 4 days, Disp-6 tablet, R-0, E-Prescribe      ondansetron (ZOFRAN ODT) 4 MG ODT tab Take 1 tablet (4 mg) by mouth every 8 hours as needed for nausea, Disp-10 tablet, R-0, E-Prescribe             Scribe Disclosure:  I, Chikis Maldonado, am serving as a scribe at 9:10 AM on 10/7/2022 to document services personally performed by Chacho Davenport MD based on my observations and the provider's statements to me.              Chacho Davenport MD  10/07/22 0653

## 2022-10-07 NOTE — ED TRIAGE NOTES
A&O x4, ABCs intact. Pt presents with cough and multiple other symptoms. Pt states that he also has burning when he urinates. He states he has been weak and not sleeping well. He reports productive cough. He states he's has had the cough for about 4 weeks       Triage Assessment     Row Name 10/07/22 0901       Triage Assessment (Adult)    Airway WDL WDL       Respiratory WDL    Respiratory WDL WDL       Cardiac WDL    Cardiac WDL WDL

## 2022-10-08 LAB
C TRACH DNA SPEC QL NAA+PROBE: NEGATIVE
N GONORRHOEA DNA SPEC QL NAA+PROBE: NEGATIVE

## 2022-10-09 ENCOUNTER — MYC MEDICAL ADVICE (OUTPATIENT)
Dept: FAMILY MEDICINE | Facility: CLINIC | Age: 42
End: 2022-10-09

## 2022-10-10 NOTE — TELEPHONE ENCOUNTER
He'll need an er follow up visit. However, can be virtual since was just seen at ER.     -marium comer, pac

## 2022-10-10 NOTE — TELEPHONE ENCOUNTER
"See Mychart messages, added other Mychart to this message as now has picture    ROUTED TO [facility], OK FOR E VISIT? PLEASE REVIEW AND ADVISE/INFORM PT VIA PrintToPeer OF PLAN    \"On the 7th I went into the ER they did a bunch of test and everything came back negative. Since I got home from that appointment my mouth has broken out into many sores and now I can t even brush my teeth. It hurts to bad.      How long do I wait before I come back and get more test done. I can t swallow or eat anything with these sores\"    Luzmaria Duff, RN, BSN  [facility]    "

## 2022-11-20 ENCOUNTER — HEALTH MAINTENANCE LETTER (OUTPATIENT)
Age: 42
End: 2022-11-20

## 2023-04-15 ENCOUNTER — HEALTH MAINTENANCE LETTER (OUTPATIENT)
Age: 43
End: 2023-04-15

## 2023-07-16 ENCOUNTER — OFFICE VISIT (OUTPATIENT)
Dept: URGENT CARE | Facility: URGENT CARE | Age: 43
End: 2023-07-16
Payer: COMMERCIAL

## 2023-07-16 VITALS
OXYGEN SATURATION: 100 % | HEART RATE: 93 BPM | BODY MASS INDEX: 24.5 KG/M2 | WEIGHT: 187 LBS | DIASTOLIC BLOOD PRESSURE: 88 MMHG | SYSTOLIC BLOOD PRESSURE: 127 MMHG | TEMPERATURE: 98.3 F

## 2023-07-16 DIAGNOSIS — J02.0 ACUTE STREPTOCOCCAL PHARYNGITIS: Primary | ICD-10-CM

## 2023-07-16 DIAGNOSIS — R07.0 THROAT PAIN: ICD-10-CM

## 2023-07-16 LAB — DEPRECATED S PYO AG THROAT QL EIA: POSITIVE

## 2023-07-16 PROCEDURE — 99203 OFFICE O/P NEW LOW 30 MIN: CPT | Performed by: INTERNAL MEDICINE

## 2023-07-16 PROCEDURE — 87880 STREP A ASSAY W/OPTIC: CPT | Performed by: INTERNAL MEDICINE

## 2023-07-16 RX ORDER — AMOXICILLIN 500 MG/1
500 CAPSULE ORAL 2 TIMES DAILY
Qty: 20 CAPSULE | Refills: 0 | Status: SHIPPED | OUTPATIENT
Start: 2023-07-16 | End: 2023-07-26

## 2023-07-16 NOTE — PROGRESS NOTES
Assessment & Plan     Acute streptococcal pharyngitis  - amoxicillin (AMOXIL) 500 MG capsule; Take 1 capsule (500 mg) by mouth 2 times daily for 10 days    Throat pain  - Streptococcus A Rapid Screen w/Reflex to PCR - Clinic Collect    Hoang Saavedra MD  [facility]    Kwabena Stein Jr is a 42 year old, presenting for the following health issues:  Pharyngitis (X 3 days. )         No data to display              HPI   Chief complaint of sore throat.  This has been present for two days.  Minor cough.  Denies URI symptoms.  Denies known ill contact.  School-aged children at home (none of them are ill).     Review of Systems   Constitutional, HEENT, cardiovascular, pulmonary, gi and gu systems are negative, except as otherwise noted.      Objective    /88   Pulse 93   Temp 98.3  F (36.8  C) (Oral)   Wt 84.8 kg (187 lb)   SpO2 100%   BMI 24.50 kg/m    Body mass index is 24.5 kg/m .  Physical Exam   GENERAL APPEARANCE: healthy, alert and no distress  HENT: ear canals and TM's normal and generalized posterior pharyngeal erythema with petechiae; no exudate  NECK: no adenopathy and no asymmetry, masses, or scars  RESP: lungs clear to auscultation - no rales, rhonchi or wheezes

## 2023-08-08 ENCOUNTER — OFFICE VISIT (OUTPATIENT)
Dept: URGENT CARE | Facility: URGENT CARE | Age: 43
End: 2023-08-08
Payer: COMMERCIAL

## 2023-08-08 VITALS
DIASTOLIC BLOOD PRESSURE: 79 MMHG | OXYGEN SATURATION: 98 % | BODY MASS INDEX: 23.93 KG/M2 | SYSTOLIC BLOOD PRESSURE: 117 MMHG | WEIGHT: 182.6 LBS | HEART RATE: 88 BPM | TEMPERATURE: 97.1 F

## 2023-08-08 DIAGNOSIS — R07.0 THROAT PAIN: ICD-10-CM

## 2023-08-08 DIAGNOSIS — J02.0 STREP THROAT: Primary | ICD-10-CM

## 2023-08-08 LAB — DEPRECATED S PYO AG THROAT QL EIA: POSITIVE

## 2023-08-08 PROCEDURE — 87880 STREP A ASSAY W/OPTIC: CPT | Performed by: FAMILY MEDICINE

## 2023-08-08 PROCEDURE — 99213 OFFICE O/P EST LOW 20 MIN: CPT | Performed by: FAMILY MEDICINE

## 2023-08-08 RX ORDER — AMOXICILLIN 875 MG
875 TABLET ORAL 2 TIMES DAILY
Qty: 20 TABLET | Refills: 0 | Status: SHIPPED | OUTPATIENT
Start: 2023-08-08 | End: 2023-08-18

## 2023-08-08 NOTE — PROGRESS NOTES
SUBJECTIVE:Emil Aparicio is a 42 year old male with a chief complaint of sore throat.    Onset of symptoms was 3 day(s) ago.    Course of illness: still present.    Severity moderate      No past medical history on file.  No Known Allergies  Social History     Tobacco Use    Smoking status: Never    Smokeless tobacco: Never   Substance Use Topics    Alcohol use: Yes       ROS:  SKIN: no rash  GI: no vomiting    OBJECTIVE:   /79   Pulse 88   Temp 97.1  F (36.2  C) (Tympanic)   Wt 82.8 kg (182 lb 9.6 oz)   SpO2 98%   BMI 23.93 kg/m  GENERAL APPEARANCE: healthy, alert and no distress  EYES: EOMI,  PERRL, conjunctiva clear  HENT: ear canals and TM's normal.  Nose normal.  Pharynx erythematous with some exudate noted.  RESP: lungs clear to auscultation - no rales, rhonchi or wheezes  SKIN: no suspicious lesions or rashes    Rapid Strep test is positive      ICD-10-CM    1. Strep throat  J02.0 amoxicillin (AMOXIL) 875 MG tablet      2. Throat pain  R07.0 Streptococcus A Rapid Screen w/Reflex to PCR - Clinic Collect          Symptomatic treat with gargles, lozenges, and OTC analgesic as needed.  Follow-up with primary clinic if not improving.

## 2023-10-03 SDOH — HEALTH STABILITY: PHYSICAL HEALTH: ON AVERAGE, HOW MANY MINUTES DO YOU ENGAGE IN EXERCISE AT THIS LEVEL?: 30 MIN

## 2023-10-03 SDOH — HEALTH STABILITY: PHYSICAL HEALTH: ON AVERAGE, HOW MANY DAYS PER WEEK DO YOU ENGAGE IN MODERATE TO STRENUOUS EXERCISE (LIKE A BRISK WALK)?: 2 DAYS

## 2023-10-03 ASSESSMENT — ENCOUNTER SYMPTOMS
DIARRHEA: 0
CHILLS: 0
WEAKNESS: 0
PARESTHESIAS: 0
HEMATOCHEZIA: 0
ARTHRALGIAS: 0
NAUSEA: 0
HEARTBURN: 0
EYE PAIN: 0
ABDOMINAL PAIN: 0
FEVER: 0
MYALGIAS: 0
FREQUENCY: 0
SHORTNESS OF BREATH: 0
PALPITATIONS: 0
COUGH: 0
HEMATURIA: 0
JOINT SWELLING: 0
HEADACHES: 0
SORE THROAT: 0
CONSTIPATION: 0
DYSURIA: 0
NERVOUS/ANXIOUS: 0
DIZZINESS: 0

## 2023-10-03 ASSESSMENT — SOCIAL DETERMINANTS OF HEALTH (SDOH)
IN A TYPICAL WEEK, HOW MANY TIMES DO YOU TALK ON THE PHONE WITH FAMILY, FRIENDS, OR NEIGHBORS?: MORE THAN THREE TIMES A WEEK
DO YOU BELONG TO ANY CLUBS OR ORGANIZATIONS SUCH AS CHURCH GROUPS UNIONS, FRATERNAL OR ATHLETIC GROUPS, OR SCHOOL GROUPS?: NO
HOW OFTEN DO YOU GET TOGETHER WITH FRIENDS OR RELATIVES?: MORE THAN THREE TIMES A WEEK
HOW OFTEN DO YOU ATTEND CHURCH OR RELIGIOUS SERVICES?: MORE THAN 4 TIMES PER YEAR
HOW OFTEN DO YOU ATTENT MEETINGS OF THE CLUB OR ORGANIZATION YOU BELONG TO?: NEVER

## 2023-10-03 ASSESSMENT — LIFESTYLE VARIABLES
HOW OFTEN DO YOU HAVE SIX OR MORE DRINKS ON ONE OCCASION: WEEKLY
HOW MANY STANDARD DRINKS CONTAINING ALCOHOL DO YOU HAVE ON A TYPICAL DAY: 3 OR 4
SKIP TO QUESTIONS 9-10: 0
HOW OFTEN DO YOU HAVE A DRINK CONTAINING ALCOHOL: 4 OR MORE TIMES A WEEK
AUDIT-C TOTAL SCORE: 8

## 2023-10-03 NOTE — COMMUNITY RESOURCES LIST (ENGLISH)
10/03/2023   [facility] [employer]  N/A  For questions about this resource list or additional care needs, please contact your primary care clinic or care manager.  Phone: [phone]   Email: N/A   Address: [address] [place] [address]   Hours: N/A        Substance Use       Outpatient substance use treatment  1  [facility] Distance: 4.54 miles      Phone/Virtual   [address] [place] [address]  Language: English, Sinhala  Hours: Mon - Fri 7:30 AM - 6:00 PM  Fees: Insurance, Self Pay   Phone: (366) 844-5907 Email: cuco@[employer]l.[employer] Website: http://www.NGDATA/     2  [facility] Distance: 5.16 miles      In-Person   [address] [place] [address]  Language: English, Hmong, Sinhala  Hours: Mon - Fri 8:00 AM - 5:00 PM , Sat 8:00 AM - 12:00 PM  Fees: Insurance, Self Pay, Sliding Fee   Phone: (167) 764-6437 Email: info@[facility].org Website: https://www.[facility].org/locations/rkhnmaybwd-dvsdrb-hs/     Substance use support group  3  [facility] - Alcoholics Anonymous Distance: 3.93 miles      In-Person   [address] [place] [address]  Language: English, Wolof  Hours: Tue 7:00 PM - 8:30 PM  Fees: Free   Phone: (715) 501-8271 Email: info@[employer].org Website: http://www.Walltik.org     4  [facility] - Substance Use Recovery Group Distance: 6.06 miles      Phone/Virtual   [address] [place] [address]  Language: English  Hours: Tue 10:00 AM - 11:30 AM  Fees: Insurance, Self Pay   Phone: (334) 617-9814 Email: abena@[employer] Website: http://www.[employer]/          Important Numbers & Websites       Emergency Services   911  City Services   311  Poison Control   (330) 984-2029  Suicide Prevention Lifeline   (900) 915-1330 (TALK)  Child Abuse Hotline   (313) 372-5407 (4-A-Child)  Sexual  Assault Hotline   (534) 580-9283 (HOPE)  National Runaway Safeline   (981) 599-3219 (RUNAWAY)  All-Options Talkline   (685) 397-3989  Substance Abuse Referral   (542) 323-9304 (HELP)

## 2023-10-10 ENCOUNTER — OFFICE VISIT (OUTPATIENT)
Dept: FAMILY MEDICINE | Facility: CLINIC | Age: 43
End: 2023-10-10
Payer: COMMERCIAL

## 2023-10-10 VITALS
HEART RATE: 83 BPM | DIASTOLIC BLOOD PRESSURE: 78 MMHG | OXYGEN SATURATION: 98 % | SYSTOLIC BLOOD PRESSURE: 120 MMHG | RESPIRATION RATE: 18 BRPM | BODY MASS INDEX: 24.73 KG/M2 | HEIGHT: 73 IN | TEMPERATURE: 97.9 F | WEIGHT: 186.6 LBS

## 2023-10-10 DIAGNOSIS — Z00.00 ROUTINE GENERAL MEDICAL EXAMINATION AT A HEALTH CARE FACILITY: Primary | ICD-10-CM

## 2023-10-10 DIAGNOSIS — B08.1 MOLLUSCUM CONTAGIOSUM: ICD-10-CM

## 2023-10-10 DIAGNOSIS — N52.9 ERECTILE DYSFUNCTION, UNSPECIFIED ERECTILE DYSFUNCTION TYPE: ICD-10-CM

## 2023-10-10 DIAGNOSIS — Z98.52 STATUS POST VASECTOMY: ICD-10-CM

## 2023-10-10 DIAGNOSIS — Z11.3 ROUTINE SCREENING FOR STI (SEXUALLY TRANSMITTED INFECTION): ICD-10-CM

## 2023-10-10 LAB
HIV 1+2 AB+HIV1 P24 AG SERPL QL IA: NONREACTIVE
T PALLIDUM AB SER QL: NONREACTIVE

## 2023-10-10 PROCEDURE — 87389 HIV-1 AG W/HIV-1&-2 AB AG IA: CPT | Performed by: PHYSICIAN ASSISTANT

## 2023-10-10 PROCEDURE — 86780 TREPONEMA PALLIDUM: CPT | Performed by: PHYSICIAN ASSISTANT

## 2023-10-10 PROCEDURE — 87491 CHLMYD TRACH DNA AMP PROBE: CPT | Performed by: PHYSICIAN ASSISTANT

## 2023-10-10 PROCEDURE — 36415 COLL VENOUS BLD VENIPUNCTURE: CPT | Performed by: PHYSICIAN ASSISTANT

## 2023-10-10 PROCEDURE — 17110 DESTRUCTION B9 LES UP TO 14: CPT | Performed by: PHYSICIAN ASSISTANT

## 2023-10-10 PROCEDURE — 99396 PREV VISIT EST AGE 40-64: CPT | Mod: 25 | Performed by: PHYSICIAN ASSISTANT

## 2023-10-10 PROCEDURE — 87591 N.GONORRHOEAE DNA AMP PROB: CPT | Performed by: PHYSICIAN ASSISTANT

## 2023-10-10 RX ORDER — SILDENAFIL CITRATE 20 MG/1
TABLET ORAL
Qty: 30 TABLET | Refills: 11 | Status: SHIPPED | OUTPATIENT
Start: 2023-10-10 | End: 2023-10-10

## 2023-10-10 RX ORDER — SILDENAFIL 50 MG/1
50 TABLET, FILM COATED ORAL DAILY PRN
Qty: 30 TABLET | Refills: 11 | Status: SHIPPED | OUTPATIENT
Start: 2023-10-10

## 2023-10-10 RX ORDER — IMIQUIMOD 12.5 MG/.25G
CREAM TOPICAL
Qty: 12 PACKET | Refills: 3 | Status: SHIPPED | OUTPATIENT
Start: 2023-10-10

## 2023-10-10 ASSESSMENT — ENCOUNTER SYMPTOMS
CONSTIPATION: 0
CHILLS: 0
MYALGIAS: 0
DYSURIA: 0
FEVER: 0
JOINT SWELLING: 0
SHORTNESS OF BREATH: 0
WEAKNESS: 0
FREQUENCY: 0
PARESTHESIAS: 0
PALPITATIONS: 0
ABDOMINAL PAIN: 0
NERVOUS/ANXIOUS: 0
NAUSEA: 0
HEADACHES: 0
EYE PAIN: 0
SORE THROAT: 0
HEMATOCHEZIA: 0
DIZZINESS: 0
HEARTBURN: 0
HEMATURIA: 0
DIARRHEA: 0
ARTHRALGIAS: 0
COUGH: 0

## 2023-10-10 ASSESSMENT — PAIN SCALES - GENERAL: PAINLEVEL: NO PAIN (0)

## 2023-10-10 NOTE — COMMUNITY RESOURCES LIST (ENGLISH)
10/10/2023   [facility] [employer]  N/A  For questions about this resource list or additional care needs, please contact your primary care clinic or care manager.  Phone: [phone]   Email: N/A   Address: [address] [place] [address]   Hours: N/A        Substance Use       Outpatient substance use treatment  1  [facility] Distance: 4.54 miles      Phone/Virtual   [address] [place] [address]  Language: English, Croatian  Hours: Mon - Fri 7:30 AM - 6:00 PM  Fees: Insurance, Self Pay   Phone: (898) 788-3605 Email: cuco@[employer] Website: http://www.[employer]time Studios/     2  [facility] Distance: 5.16 miles      In-Person   [address] [place] [address]  Language: English, Hmong, Croatian  Hours: Mon - Fri 8:00 AM - 5:00 PM , Sat 8:00 AM - 12:00 PM  Fees: Insurance, Self Pay, Sliding Fee   Phone: (406) 596-4914 Email: info@[facility].org Website: https://www.[facility].org/locations/fezzljffud-yvomiv-dd/     Substance use support group  3  [facility] - Alcoholics Anonymous Distance: 3.93 miles      In-Person   [address] [place] [address]  Language: English, Amharic  Hours: Tue 7:00 PM - 8:30 PM  Fees: Free   Phone: (903) 952-4264 Email: info@[employer].org Website: http://www.[employer].org     4  [facility] - Substance Use Recovery Group Distance: 6.06 miles      Phone/Virtual   [address] [place] [address]  Language: English  Hours: Tue 10:00 AM - 11:30 AM  Fees: Insurance, Self Pay   Phone: (822) 819-5482 Email: abena@Zipdial.[employer] Website: http://www.Sina.[employer]/          Important Numbers & Websites       Emergency Services   911  City Services   311  Poison Control   (416) 842-7491  Suicide Prevention Lifeline   (731) 877-4280 (TALK)  Child Abuse Hotline   (406) 887-6063 (4-A-Child)  Sexual  Assault Hotline   (919) 186-5768 (HOPE)  National Runaway Safeline   (545) 421-3053 (RUNAWAY)  All-Options Talkline   (239) 292-2601  Substance Abuse Referral   (635) 128-8294 (HELP)

## 2023-10-10 NOTE — COMMUNITY RESOURCES LIST (ENGLISH)
10/10/2023   [facility]  N/A  For questions about this resource list or additional care needs, please contact your primary care clinic or care manager.  Phone: [phone]   Email: N/A   Address: [address] [place] [address]   Hours: N/A        Substance Use       Outpatient substance use treatment  1  [facility] Distance: 4.54 miles      Phone/Virtual   [address] [place] [address]  Language: English, Bulgarian  Hours: Mon - Fri 7:30 AM - 6:00 PM  Fees: Insurance, Self Pay   Phone: (738) 447-7282 Email: cuco@[employer] Website: http://www.[employer]View Communications/     2  [facility] Distance: 5.16 miles      In-Person   [address] [place] [address]  Language: English, Hmong, Bulgarian  Hours: Mon - Fri 8:00 AM - 5:00 PM , Sat 8:00 AM - 12:00 PM  Fees: Insurance, Self Pay, Sliding Fee   Phone: (401) 509-5274 Email: info@[facility].org Website: https://www.[facility].org/locations/mwjuiwdemc-ythymm-ey/     Substance use support group  3  [facility] - Alcoholics Anonymous Distance: 3.93 miles      In-Person   [address] [place] [address]  Language: English, Turkish  Hours: Tue 7:00 PM - 8:30 PM  Fees: Free   Phone: (403) 490-8804 Email: info@[employer].org Website: http://www.CL3VER.org     4  [facility] - Substance Use Recovery Group Distance: 6.06 miles      Phone/Virtual   [address] [place] [address]  Language: English  Hours: Tue 10:00 AM - 11:30 AM  Fees: Insurance, Self Pay   Phone: (698) 318-4220 Email: abena@[employer] Website: http://www.[employer]/          Important Numbers & Websites       Emergency Services   911  City Services   311  Poison Control   (692) 683-3699  Suicide Prevention Lifeline   (236) 984-6032 (TALK)  Child Abuse Hotline   (149) 828-7011 (4-A-Child)  Sexual  Assault Hotline   (105) 887-1851 (HOPE)  National Runaway Safeline   (181) 698-1108 (RUNAWAY)  All-Options Talkline   (105) 449-5437  Substance Abuse Referral   (371) 226-1455 (HELP)

## 2023-10-10 NOTE — COMMUNITY RESOURCES LIST (ENGLISH)
10/10/2023   [facility] [employer]  N/A  For questions about this resource list or additional care needs, please contact your primary care clinic or care manager.  Phone: [phone]   Email: N/A   Address: [address] [place] [address]   Hours: N/A        Substance Use       Outpatient substance use treatment  1  [facility] Distance: 4.54 miles      Phone/Virtual   [address] [place] [address]  Language: English, Arabic  Hours: Mon - Fri 7:30 AM - 6:00 PM  Fees: Insurance, Self Pay   Phone: (961) 270-4524 Email: cuco@[employer] Website: http://www.Innobits/     2  [facility] Distance: 5.16 miles      In-Person   [address] [place] [address]  Language: English, Hmong, Arabic  Hours: Mon - Fri 8:00 AM - 5:00 PM , Sat 8:00 AM - 12:00 PM  Fees: Insurance, Self Pay, Sliding Fee   Phone: (812) 272-6889 Email: info@[facility].org Website: https://www.[facility].org/locations/zyplkoppou-zpjtpv-xl/     Substance use support group  3  [facility] - Alcoholics Anonymous Distance: 3.93 miles      In-Person   [address] [place] [address]  Language: English, Amharic  Hours: Tue 7:00 PM - 8:30 PM  Fees: Free   Phone: (745) 521-9251 Email: info@[employer].org Website: http://www.Loudeye.org     4   - Substance Use Recovery Group Distance: 6.06 miles      Phone/Virtual   [address] [place] [address]  Language: English  Hours: Tue 10:00 AM - 11:30 AM  Fees: Insurance, Self Pay   Phone: (619) 521-7521 Email: abena@[employer] Website: http://www.Tenantrex.[employer]/          Important Numbers & Websites       Emergency Services   911  City Services   311  Poison Control   (109) 365-1189  Suicide Prevention Lifeline   (780) 312-1268 (TALK)  Child Abuse Hotline   (344) 565-3860 (4-A-Child)  Sexual  Assault Hotline   (184) 695-6900 (HOPE)  National Runaway Safeline   (723) 361-2863 (RUNAWAY)  All-Options Talkline   (341) 213-7216  Substance Abuse Referral   (600) 179-8861 (HELP)

## 2023-10-10 NOTE — PROGRESS NOTES
SUBJECTIVE:   CC: Emil Viera is an 42 year old who presents for preventative health visit.        No data to display                Healthy Habits:     Getting at least 3 servings of Calcium per day:  NO    Bi-annual eye exam:  Yes    Dental care twice a year:  Yes    Sleep apnea or symptoms of sleep apnea:  Daytime drowsiness    Diet:  Regular (no restrictions)    Frequency of exercise:  None    Taking medications regularly:  Yes    Medication side effects:  Not applicable    Additional concerns today:  Yes    The 10-year ASCVD risk score (Helen YARBROUGH, et al., 2019) is: 1.2%    Values used to calculate the score:      Age: 42 years      Sex: Male      Is Non- : No      Diabetic: No      Tobacco smoker: No      Systolic Blood Pressure: 120 mmHg      Is BP treated: No      HDL Cholesterol: 71 mg/dL      Total Cholesterol: 219 mg/dL    Today's PHQ-2 Score:       10/10/2023    10:47 AM   PHQ-2 ( 1999 Pfizer)   Q1: Little interest or pleasure in doing things 0   Q2: Feeling down, depressed or hopeless 0   PHQ-2 Score 0   Q1: Little interest or pleasure in doing things Not at all   Q2: Feeling down, depressed or hopeless Not at all   PHQ-2 Score 0           PT would like skin tag removal. Present on right groin. Present for years and worsening. No pain.     Also would like STI testing. No symptoms or known exposures. 2 partners over last year.     Refill of viagra requested. No side effects. Works well.     Also, had vasectomy years ago but needed revision. Is worried has healed again. Requesting testing or referral for testing    Social History     Tobacco Use    Smoking status: Never    Smokeless tobacco: Never   Substance Use Topics    Alcohol use: Yes             10/3/2023    10:53 AM   Alcohol Use   Prescreen: >3 drinks/day or >7 drinks/week? Yes   AUDIT SCORE  8     Last PSA: No results found for: PSA    Reviewed orders with patient. Reviewed health maintenance and updated orders accordingly -  Yes  BP Readings from Last 3 Encounters:   10/10/23 120/78   08/08/23 117/79   07/16/23 127/88    Wt Readings from Last 3 Encounters:   10/10/23 84.6 kg (186 lb 9.6 oz)   08/08/23 82.8 kg (182 lb 9.6 oz)   07/16/23 84.8 kg (187 lb)                  Patient Active Problem List   Diagnosis    Hyperplastic colonic polyp, unspecified part of colon     History reviewed. No pertinent surgical history.    Social History     Tobacco Use    Smoking status: Never    Smokeless tobacco: Never   Substance Use Topics    Alcohol use: Yes     Family History   Problem Relation Age of Onset    Kidney Cancer Father     Colon Cancer Paternal Grandfather     Colon Cancer Paternal Uncle     Colon Cancer Paternal Uncle          Current Outpatient Medications   Medication Sig Dispense Refill    imiquimod (ALDARA) 5 % external cream Apply a small sized amount to warts or molluscum three times weekly at bedtime.   Wash off after 8 hours.   May use for up to 16 weeks. 12 packet 3    sildenafil (VIAGRA) 50 MG tablet Take 1 tablet (50 mg) by mouth daily as needed (ED) 30 tablet 11     No Known Allergies  Recent Labs   Lab Test 10/07/22  0940 03/08/22  0859   LDL  --  118*   HDL  --  71   TRIG  --  150*   ALT 32  --    CR 0.57*  --    GFRESTIMATED >90  --    POTASSIUM 4.0  --    TSH 0.77  --         Reviewed and updated as needed this visit by clinical staff   Tobacco  Allergies  Meds  Problems  Med Hx  Surg Hx  Fam Hx          Reviewed and updated as needed this visit by Provider   Tobacco  Allergies  Meds  Problems  Med Hx  Surg Hx  Fam Hx         History reviewed. No pertinent past medical history.   History reviewed. No pertinent surgical history.    Review of Systems   Constitutional:  Negative for chills and fever.   HENT:  Negative for congestion, ear pain, hearing loss and sore throat.    Eyes:  Negative for pain and visual disturbance.   Respiratory:  Negative for cough and shortness of breath.    Cardiovascular:  Negative  "for chest pain, palpitations and peripheral edema.   Gastrointestinal:  Negative for abdominal pain, constipation, diarrhea, heartburn, hematochezia and nausea.   Genitourinary:  Negative for dysuria, frequency, genital sores, hematuria, impotence, penile discharge and urgency.   Musculoskeletal:  Negative for arthralgias, joint swelling and myalgias.   Skin:  Negative for rash.   Neurological:  Negative for dizziness, weakness, headaches and paresthesias.   Psychiatric/Behavioral:  Negative for mood changes. The patient is not nervous/anxious.      OBJECTIVE:   /78 (BP Location: Right arm, Patient Position: Sitting, Cuff Size: Adult Regular)   Pulse 83   Temp 97.9  F (36.6  C) (Oral)   Resp 18   Ht 1.854 m (6' 1\")   Wt 84.6 kg (186 lb 9.6 oz)   SpO2 98%   BMI 24.62 kg/m      Physical Exam  GENERAL: healthy, alert and no distress  EYES: Eyes grossly normal to inspection, PERRL and conjunctivae and sclerae normal  HENT: ear canals and TM's normal, nose and mouth without ulcers or lesions  NECK: no adenopathy, no asymmetry, masses, or scars and thyroid normal to palpation  RESP: lungs clear to auscultation - no rales, rhonchi or wheezes  CV: regular rate and rhythm, normal S1 S2, no S3 or S4, no murmur, click or rub, no peripheral edema and peripheral pulses strong  ABDOMEN: soft, nontender, no hepatosplenomegaly, no masses and bowel sounds normal  MS: no gross musculoskeletal defects noted, no edema  SKIN: multiple (7) centralized dimplated papules noted on right inguinal region extending to suprapubic region.   NEURO: Normal strength and tone, mentation intact and speech normal  PSYCH: mentation appears normal, affect normal/bright  LYMPH: no cervical adenopathy      ASSESSMENT/PLAN:   (Z00.00) Routine general medical examination at a health care facility  (primary encounter diagnosis)  Comment: stable exam. Not fasting. Will return fasting lab only.   Plan:     (B08.1) Molluscum contagiosum  Comment: " present on exam. Educated on sexually transmitted etiology. Avoid sexual activity until lesions resolve. Cryo used. Imquimod given if further lesions develop. Follow up prn. See patient instructions.   Plan: imiquimod (ALDARA) 5 % external cream,         OFFICE/OUTPT VISIT,EST,LEVL III, DESTRUCT         BENIGN LESION, UP TO 14        Medication use and side effects discussed with the patient. Patient is in complete understanding and agreement with plan.   Cryotherapy Procedure Note      Pre-operative Diagnosis: molluscum contagiosum       Post-operative Diagnosis: same       Locations: right groin and suprapubic region.       Indications: cosmetic lesion.       Patient informed of risks (permanent scarring, infection, light or dark discoloration, bleeding, infection, weakness, numbness and recurrence of the lesion) and benefits of the procedure and printed informed consent obtained.      The areas are treated with liquid nitrogen therapy, frozen until ice ball extended 3 mm beyond lesion, allowed to thaw, and treated again. The patient tolerated procedure well. The patient was instructed on post-op care, warned that there may be blister formation, redness and pain. Recommend OTC analgesia as needed for pain.      Condition:  Stable      Complications:  none.      Plan:  1. Instructed to keep the area dry and covered for 24-48h and clean thereafter.  2. Warning signs of infection were reviewed.    3. Recommended that the patient use tylenol as needed for pain.   4. Return in prn   .    (Z11.3) Routine screening for STI (sexually transmitted infection)  Comment:   Plan: Neisseria gonorrhoeae PCR, Chlamydia         trachomatis PCR, Treponema Abs w Reflex to RPR         and Titer, HIV Antigen Antibody Combo            (Z98.52) Status post vasectomy  Comment: will check status.   Plan: Semen Analysis Post Vasectomy, CANCELED: Semen         Analysis, Strict Morphology (FREDERICK)            (N52.9) Erectile dysfunction,  unspecified erectile dysfunction type  Comment: refill needed.   Plan: sildenafil (VIAGRA) 50 MG tablet, DISCONTINUED:        sildenafil (REVATIO) 20 MG tablet        Medication use and side effects discussed with the patient. Patient is in complete understanding and agreement with plan.       Patient has been advised of split billing requirements and indicates understanding: Yes      COUNSELING:   Reviewed preventive health counseling, as reflected in patient instructions       Regular exercise       Healthy diet/nutrition       Immunizations  Declined: Covid-19, Hepatitis B, and Influenza due to Conscientious objector            He reports that he has never smoked. He has never used smokeless tobacco.      FRANCISCO JAVIER [facility]

## 2023-10-11 LAB
C TRACH DNA SPEC QL NAA+PROBE: NEGATIVE
N GONORRHOEA DNA SPEC QL NAA+PROBE: NEGATIVE

## 2023-10-18 LAB — SEMEN ANALYSIS P VAS PNL: NORMAL

## 2023-10-18 PROCEDURE — 89321 SEMEN ANAL SPERM DETECTION: CPT | Performed by: PHYSICIAN ASSISTANT

## 2023-10-27 ENCOUNTER — TELEPHONE (OUTPATIENT)
Dept: FAMILY MEDICINE | Facility: CLINIC | Age: 43
End: 2023-10-27

## 2023-10-27 NOTE — TELEPHONE ENCOUNTER
Please call patient. Cancelled evisit and needing in person. May use Provider approval slot.     Otherwise, extender.     Sukhwinder comer, pac

## 2023-10-30 ENCOUNTER — OFFICE VISIT (OUTPATIENT)
Dept: FAMILY MEDICINE | Facility: CLINIC | Age: 43
End: 2023-10-30
Payer: COMMERCIAL

## 2023-10-30 VITALS
HEART RATE: 96 BPM | HEIGHT: 73 IN | TEMPERATURE: 98.4 F | SYSTOLIC BLOOD PRESSURE: 124 MMHG | WEIGHT: 193 LBS | DIASTOLIC BLOOD PRESSURE: 72 MMHG | RESPIRATION RATE: 16 BRPM | BODY MASS INDEX: 25.58 KG/M2

## 2023-10-30 DIAGNOSIS — R39.89 GENITAL SORE: Primary | ICD-10-CM

## 2023-10-30 PROCEDURE — 99213 OFFICE O/P EST LOW 20 MIN: CPT

## 2023-10-30 PROCEDURE — 87529 HSV DNA AMP PROBE: CPT

## 2023-10-30 RX ORDER — VALACYCLOVIR HYDROCHLORIDE 1 G/1
1000 TABLET, FILM COATED ORAL 2 TIMES DAILY
Qty: 14 TABLET | Refills: 0 | Status: SHIPPED | OUTPATIENT
Start: 2023-10-30 | End: 2023-11-03

## 2023-10-30 NOTE — PATIENT INSTRUCTIONS
Labs, will folow up with you on this    Valtrex to treat prophylactic, if labs negative can stop

## 2023-10-30 NOTE — PROGRESS NOTES
"  Assessment & Plan     (R39.89) Genital sore  (primary encounter diagnosis)  Comment: high suspicion for HSV. We will test today via swab. Will also prophylactically treat w/ Valtrex. If labs return negative, patient to stop Valtrex. Discussed medication risks and benefits of Valtrex with patient in detail with patient verbal understanding. Will follow up on lab results when obtained.  Patient fully understands and is agreeable with plan of care, at this point patient will follow up as needed unless acute concerns arise in the meantime.  Plan: HSV Types 1 and 2 Qualitative PCR CSF,         valACYclovir (VALTREX) 1000 mg tablet          28 minutes spent by me on the date of the encounter doing chart review, history and exam, documentation and further activities per the note       BMI:   Estimated body mass index is 25.58 kg/m  as calculated from the following:    Height as of this encounter: 1.85 m (6' 0.84\").    Weight as of this encounter: 87.5 kg (193 lb).     MELIDA Blunt CNP  [facility]   Emil Viera is a 42 year old, presenting for the following health issues:  STD (check)        10/10/2023    10:57 AM   Additional Questions   Roomed by Ananya Waters       History of Present Illness       Reason for visit:  Std?    He eats 0-1 servings of fruits and vegetables daily.He consumes 2 sweetened beverage(s) daily.He exercises with enough effort to increase his heart rate 20 to 29 minutes per day.  He exercises with enough effort to increase his heart rate 3 or less days per week.   He is taking medications regularly.       Rash  Onset/Duration: week ago  Description  Location: fore skin of penis  Character: pustules -2 of them   Itching: mild  Intensity:  moderate  Progression of Symptoms:  worse a week ago, now a little better, scab has fallen off and now sx better  Accompanying signs and symptoms:   Fever: No  Body aches or joint pain: No  Sore throat symptoms: " No  Recent cold symptoms: No  History:           Previous episodes of similar rash: None  New exposures:  None  Recent travel: No  Exposure to similar rash: No  Precipitating or alleviating factors: none  Therapies tried and outcome: cream-worked well     -Had cryo thought to be molluscum a couple weeks ago  -Cream started to work well  -Started getting spots/sores on the scrotum following this that did not look like molluscum  -Currently has 2 open sores on scrotum, not extremely painful  -Unsure of where he could have contracted  -Has had multiple partners in past few years  -No sex for 1.5 months  -Concerned that is may be HSV.  -No fever, chills, myalgias    Review of Systems   Constitutional, ,skin,systems are negative, except as otherwise noted.      Objective    There were no vitals taken for this visit.  There is no height or weight on file to calculate BMI.  Physical Exam  Vitals and nursing note reviewed.   Constitutional:       General: He is not in acute distress.     Appearance: Normal appearance. He is not ill-appearing.   Cardiovascular:      Rate and Rhythm: Normal rate.   Pulmonary:      Effort: Pulmonary effort is normal.   Genitourinary:         Comments: 3-4 open sores noted to scrotum in highlighted area, lower right sore on scrotum painful to touch w/ swab. Multiple scabs noted in highlighted area encircled on lower abdomen.    Skin:     General: Skin is warm and dry.   Neurological:      Mental Status: He is alert.   Psychiatric:         Mood and Affect: Mood normal.         Behavior: Behavior normal.         Thought Content: Thought content normal.         Judgment: Judgment normal.

## 2023-10-31 ENCOUNTER — TELEPHONE (OUTPATIENT)
Dept: FAMILY MEDICINE | Facility: CLINIC | Age: 43
End: 2023-10-31
Payer: COMMERCIAL

## 2023-10-31 NOTE — TELEPHONE ENCOUNTER
Patient calling for results from visit yesterday.  Advised result is not back yet.  Does use his mychart.  Advised he will get message that he has a result when the lab is back.  Nu Sandoval RN

## 2023-11-01 ENCOUNTER — TELEPHONE (OUTPATIENT)
Dept: FAMILY MEDICINE | Facility: CLINIC | Age: 43
End: 2023-11-01
Payer: COMMERCIAL

## 2023-11-01 DIAGNOSIS — R39.89 GENITAL SORE: Primary | ICD-10-CM

## 2023-11-01 LAB
HSV1 DNA SPEC QL NAA+PROBE: NOT DETECTED
HSV2 DNA SPEC QL NAA+PROBE: NOT DETECTED

## 2023-11-01 NOTE — TELEPHONE ENCOUNTER
Yes, we can proceed w/ this to ensure. Lab ordered, patient can go to any [facility] location to have this done.     MELIDA Blunt CNP

## 2023-11-01 NOTE — TELEPHONE ENCOUNTER
Patient calling back with questions. When can he have blood drawn. Advised can have the blood test drawn at any time just need a lab only appointment if not seeing a provider.     Still has sores on scrotum what is the next steps should be as they are painful. He is requesting a follow up to discuss.     Scheduled patient for follow up per his request.     CHAU Cisneros on 11/1/2023 at 1:51 PM

## 2023-11-01 NOTE — TELEPHONE ENCOUNTER
Pt calls,     S-(situation):  & B-(background): calls with update on herpes result, still has sore, would like blood tests ordered, also wonders if needs any follow up testing    A-(assessment): follow up herpes question    R-(recommendations): routed to Patricio, please review and advise, route to inform pt of plan on cell    Telephone Information:   Mobile [phone]       Luzmaria Duff RN, BSN  [facility]

## 2023-11-01 NOTE — TELEPHONE ENCOUNTER
Pt calls, informed, has appointment tomorrow, will get lab at visit tomorrow  Luzmaria Duff RN, BSN  [facility]

## 2023-11-02 ENCOUNTER — OFFICE VISIT (OUTPATIENT)
Dept: FAMILY MEDICINE | Facility: CLINIC | Age: 43
End: 2023-11-02
Payer: COMMERCIAL

## 2023-11-02 VITALS
TEMPERATURE: 98.2 F | BODY MASS INDEX: 24.8 KG/M2 | DIASTOLIC BLOOD PRESSURE: 88 MMHG | OXYGEN SATURATION: 97 % | HEIGHT: 73 IN | HEART RATE: 86 BPM | SYSTOLIC BLOOD PRESSURE: 135 MMHG | RESPIRATION RATE: 16 BRPM | WEIGHT: 187.1 LBS

## 2023-11-02 DIAGNOSIS — Z00.00 ROUTINE GENERAL MEDICAL EXAMINATION AT A HEALTH CARE FACILITY: ICD-10-CM

## 2023-11-02 DIAGNOSIS — R39.89 GENITAL SORE: Primary | ICD-10-CM

## 2023-11-02 LAB
CHOLEST SERPL-MCNC: 207 MG/DL
FASTING STATUS PATIENT QL REPORTED: YES
GLUCOSE SERPL-MCNC: 100 MG/DL (ref 70–99)
HDLC SERPL-MCNC: 73 MG/DL
HSV1 IGG SERPL QL IA: 27.7 INDEX
HSV1 IGG SERPL QL IA: ABNORMAL
HSV2 IGG SERPL QL IA: 0.27 INDEX
HSV2 IGG SERPL QL IA: ABNORMAL
LDLC SERPL CALC-MCNC: 120 MG/DL
NONHDLC SERPL-MCNC: 134 MG/DL
TRIGL SERPL-MCNC: 72 MG/DL

## 2023-11-02 PROCEDURE — 80061 LIPID PANEL: CPT

## 2023-11-02 PROCEDURE — 99213 OFFICE O/P EST LOW 20 MIN: CPT

## 2023-11-02 PROCEDURE — 86695 HERPES SIMPLEX TYPE 1 TEST: CPT

## 2023-11-02 PROCEDURE — 82947 ASSAY GLUCOSE BLOOD QUANT: CPT

## 2023-11-02 PROCEDURE — 86696 HERPES SIMPLEX TYPE 2 TEST: CPT

## 2023-11-02 PROCEDURE — 36415 COLL VENOUS BLD VENIPUNCTURE: CPT

## 2023-11-02 NOTE — PROGRESS NOTES
Assessment & Plan     (R39.89) Genital sore  (primary encounter diagnosis)  Comment: will get HSV blood test today for secondary check given negative HSV swab. Quite possible that patient could have caused a dermal ulcer given imiquimod cream on scrotum. Will have patient finish out Valtrex however. Provided dermatology referral in the instance that ulcers recur at a later time. Patient fully understands and is agreeable with plan of care, at this point patient will follow up as needed unless acute concerns arise in the meantime.  Plan: Adult Dermatology  Referral    (Z00.00) Routine general medical examination at a health care facility  Comment: lipid panel released from PCP visit.   Plan: lipid panel    20 minutes spent by me on the date of the encounter doing chart review, history and exam, documentation and further activities per the note      MELIDA Blunt CNP  [facility]   Emil Viera is a 42 year old, presenting for the following health issues:  Derm Problem (Sores on scrotum)        11/2/2023     9:01 AM   Additional Questions   Roomed by Khadra Andrade       History of Present Illness       Reason for visit:  Std?    He eats 0-1 servings of fruits and vegetables daily.He consumes 2 sweetened beverage(s) daily.He exercises with enough effort to increase his heart rate 20 to 29 minutes per day.  He exercises with enough effort to increase his heart rate 3 or less days per week.   He is taking medications regularly.     Patient HSV swab returned negative  HSV panel negative  Patient prior to ulcers appearing had been using imiquimod cream on scrotum quite regularly for over a week  Currently taking Valtrex and patient feels this has been  helping  Would like to get HSV blood test to ensure.     Review of Systems   Constitutional, HEENT, cardiovascular, pulmonary, GI, , skin, and psych systems are negative, except as otherwise noted.      Objective    BP  "135/88 (BP Location: Right arm, Patient Position: Sitting, Cuff Size: Adult Regular)   Pulse 86   Temp 98.2  F (36.8  C) (Oral)   Resp 16   Ht 1.85 m (6' 0.84\")   Wt 84.9 kg (187 lb 1.6 oz)   SpO2 97%   BMI 24.80 kg/m    Body mass index is 24.8 kg/m .  Physical Exam  Vitals and nursing note reviewed.   Constitutional:       General: He is not in acute distress.     Appearance: Normal appearance. He is not ill-appearing.   Cardiovascular:      Rate and Rhythm: Normal rate.   Pulmonary:      Effort: Pulmonary effort is normal.   Genitourinary:     Comments:  exam deferred today by patient, recently performed.   Neurological:      Mental Status: He is alert.   Psychiatric:         Mood and Affect: Mood is anxious.         Behavior: Behavior normal.         Thought Content: Thought content normal.         Judgment: Judgment normal.              "

## 2023-11-02 NOTE — PATIENT INSTRUCTIONS
Hsv blood test today    Derm referral if ulcers recurr at a later time if HSV tests comes back negative again.

## 2023-11-03 ENCOUNTER — TELEPHONE (OUTPATIENT)
Dept: FAMILY MEDICINE | Facility: CLINIC | Age: 43
End: 2023-11-03
Payer: COMMERCIAL

## 2023-11-03 DIAGNOSIS — R39.89 GENITAL SORE: ICD-10-CM

## 2023-11-03 RX ORDER — VALACYCLOVIR HYDROCHLORIDE 1 G/1
1000 TABLET, FILM COATED ORAL 2 TIMES DAILY
Qty: 14 TABLET | Refills: 1 | Status: SHIPPED | OUTPATIENT
Start: 2023-11-03

## 2023-11-03 NOTE — TELEPHONE ENCOUNTER
Valtrex re ordered w/ 1 refill. I would recommend prophylactic therapy if having more breakouts following this.     MELIDA Blunt CNP

## 2023-11-03 NOTE — TELEPHONE ENCOUNTER
Called patient.  Discussed daily vs as needed therapy.  For now he would just like RX for outbreaks sent.  Do you want to add refills?  If having outbreaks will consider daily therapy.  Nu Sandoval RN

## 2023-11-03 NOTE — TELEPHONE ENCOUNTER
Reason for Call:  Other start meds    Detailed comments: pt saw Dr. Caba - pt would like to start Valtrex. Pt would also like instructions on how to use the meds - does he take it all the time or as needed?  Pt stated call or mychart would be ok for info    Phone Number Patient can be reached at: Home number on file [phone] (home)    Best Time: any    Can we leave a detailed message on this number? YES    Call taken on 11/3/2023 at 12:47 PM by Vesna Reyez

## 2023-11-06 NOTE — TELEPHONE ENCOUNTER
"Called pt and relayed provider message below. Patient was given an opportunity to ask questions, verbalized understanding of plan, and is agreeable.    Pt states \"my pharmacy told me that this was denied by insurance\". Pt has already received a course of this medication on 10/30/23 which was covered by his insurance.     Advised pt to contact pharmacy to see why rx is denied as insurance has already covered this for pt a week ago (perhaps insurance is stating it is too early for a fill?). Also, advised pt to contact his insurance to discuss. Patient was given an opportunity to ask questions, verbalized understanding of plan, and is agreeable.    Marine RUBY RN    "

## 2023-12-06 ENCOUNTER — OFFICE VISIT (OUTPATIENT)
Dept: INTERNAL MEDICINE | Facility: CLINIC | Age: 43
End: 2023-12-06
Payer: COMMERCIAL

## 2023-12-06 VITALS
TEMPERATURE: 97.3 F | HEIGHT: 73 IN | HEART RATE: 100 BPM | BODY MASS INDEX: 24.92 KG/M2 | RESPIRATION RATE: 16 BRPM | SYSTOLIC BLOOD PRESSURE: 112 MMHG | DIASTOLIC BLOOD PRESSURE: 68 MMHG | OXYGEN SATURATION: 99 % | WEIGHT: 188 LBS

## 2023-12-06 DIAGNOSIS — J02.9 SORE THROAT: Primary | ICD-10-CM

## 2023-12-06 LAB
DEPRECATED S PYO AG THROAT QL EIA: NEGATIVE
GROUP A STREP BY PCR: NOT DETECTED

## 2023-12-06 PROCEDURE — 87651 STREP A DNA AMP PROBE: CPT | Performed by: INTERNAL MEDICINE

## 2023-12-06 PROCEDURE — 99213 OFFICE O/P EST LOW 20 MIN: CPT | Performed by: INTERNAL MEDICINE

## 2023-12-06 ASSESSMENT — ENCOUNTER SYMPTOMS: SORE THROAT: 1

## 2023-12-06 NOTE — PATIENT INSTRUCTIONS
Sore Throat: Care Instructions  Overview     Infection by bacteria or a virus causes most sore throats. Cigarette smoke, dry air, air pollution, allergies, and yelling can also cause a sore throat. Sore throats can be painful and annoying. Fortunately, most sore throats go away on their own. If you have a bacterial infection, your doctor may prescribe antibiotics.  Follow-up care is a key part of your treatment and safety. Be sure to make and go to all appointments, and call your doctor if you are having problems. It's also a good idea to know your test results and keep a list of the medicines you take.  How can you care for yourself at home?  If your doctor prescribed antibiotics, take them as directed. Do not stop taking them just because you feel better. You need to take the full course of antibiotics.  Gargle with warm salt water several times a day to help reduce swelling and relieve pain. Mix 1/2 teaspoon of salt in 1 cup of warm water.  Take an over-the-counter pain medicine, such as acetaminophen (Tylenol), ibuprofen (Advil, Motrin), or naproxen (Aleve). Read and follow all instructions on the label.  Be careful when taking over-the-counter cold or flu medicines and Tylenol at the same time. Many of these medicines have acetaminophen, which is Tylenol. Read the labels to make sure that you are not taking more than the recommended dose. Too much acetaminophen (Tylenol) can be harmful.  Drink plenty of fluids. Fluids may help soothe an irritated throat. Hot fluids, such as tea or soup, may help decrease throat pain.  Use over-the-counter throat lozenges to soothe pain. Regular cough drops or hard candy may also help. These should not be given to young children because of the risk of choking.  Do not smoke or allow others to smoke around you. If you need help quitting, talk to your doctor about stop-smoking programs and medicines. These can increase your chances of quitting for good.  Use a vaporizer or  "humidifier to add moisture to your bedroom. Follow the directions for cleaning the machine.  When should you call for help?   Call your doctor now or seek immediate medical care if:    You have trouble breathing.     Your sore throat gets much worse on one side.     You have new or worse trouble swallowing.     You have a new or higher fever.   Watch closely for changes in your health, and be sure to contact your doctor if you do not get better as expected.  Where can you learn more?  Go to https://www.[employer].net/patiented  Enter U420 in the search box to learn more about \"Sore Throat: Care Instructions.\"  Current as of: February 28, 2023               Content Version: 13.8    [id number] [employer].   Care instructions adapted under license by your healthcare professional. If you have questions about a medical condition or this instruction, always ask your healthcare professional. [employer] disclaims any warranty or liability for your use of this information.      "

## 2023-12-06 NOTE — PROGRESS NOTES
"  Assessment & Plan     Sore throat  Discussed with the patient and differentials including streptococcal versus viral pharyngitis. Test for strep throat and treatment with antibiotics if positive. We discussed on symptomatic management.  If symptoms are persisting beyond 2 to 3-week window, can consider further evaluation.  This may include an ENT referral.  This was discussed with the patient.  - benzocaine-menthol (CEPACOL EXTRA STRENGTH) 15-2.6 MG lozenge; Place 1 lozenge inside cheek every 2 hours as needed for sore throat Allow lozenge to dissolve slowly in the mouth. May be repeated every 2 hours as needed.  - Streptococcus A Rapid Screen w/Reflex to PCR - Clinic Collect  - Group A Streptococcus PCR Throat Swab               Radha Sandoval MD  [facility]    Kwabena Stein Jr is a 42 year old, presenting for the following health issues:  Pharyngitis (Started 8 days ago)        12/6/2023     9:08 AM   Additional Questions   Roomed by Jahairai   Accompanied by Self       History of Present Illness       Reason for visit:  Sore throat  Symptom onset:  1-2 weeks ago  Symptoms include:  Sore throat  Symptom intensity:  Severe  Symptom progression:  Staying the same  Had these symptoms before:  Yes  Has tried/received treatment for these symptoms:  No  What makes it worse:  No  What makes it better:  No    He eats 2-3 servings of fruits and vegetables daily.He consumes 2 sweetened beverage(s) daily.He exercises with enough effort to increase his heart rate 30 to 60 minutes per day.  He exercises with enough effort to increase his heart rate 4 days per week.   He is taking medications regularly.                 Review of Systems   HENT:  Positive for sore throat.             Objective    /68   Pulse 100   Temp 97.3  F (36.3  C) (Tympanic)   Resp 16   Ht 1.85 m (6' 0.84\")   Wt 85.3 kg (188 lb)   SpO2 99%   BMI 24.91 kg/m    Body mass index is 24.91 kg/m .  Physical Exam   GENERAL: " healthy, alert and no distress  EYES: Eyes grossly normal to inspection, PERRL and conjunctivae and sclerae normal  HENT: Pharyngeal erythema noted at the back of the throat.